# Patient Record
(demographics unavailable — no encounter records)

---

## 2025-03-27 NOTE — HISTORY OF PRESENT ILLNESS
[FreeTextEntry1] : Patient presents for follow up for SNHL, decreased hearing. Hearing different sounds lately. He hears noises in R ear especially when quiet. Symptoms worsened last Monday. Had episodes of B/L decreased hearing that resolved on its own. Last audiogram 5/8/24. He was to have hearing aids but did not.  C/o of dizziness, room spinning for seconds, lightheadedness, imbalance when laying down to getting up.

## 2025-05-15 NOTE — REASON FOR VISIT
[Subsequent Evaluation] : a subsequent evaluation for [FreeTextEntry2] : decreased hearing in right ear, dizziness, SNHL, tinnitus of right ear

## 2025-05-15 NOTE — HISTORY OF PRESENT ILLNESS
[FreeTextEntry1] : Pt returns today c/o decreased hearing in right ear, dizziness, SNHL, tinnitus of right ear.  States that he is doing well. Had dizziness before for few seconds when getting up and laying down. VNG performed today. Pt is using hearing aids and is satisfied.

## 2025-05-15 NOTE — DATA REVIEWED
[de-identified] : VNG 5/15/25 VNG test results were abnormal. Mixed spontaneous and positional nystagmus is consistent with non- localizing vestibular dysfunction. Responses to caloric irrigations reveal right unilateral weakness, consistent with right- sided vestibular dysfunction.

## 2025-07-28 NOTE — HISTORY OF PRESENT ILLNESS
[FreeTextEntry1] : Patient returns today c/o  tinnitus . Has  ringing and whooshing in right ear ,  pitch increase and decrease through out the day. Feels like it has gotten worse. Has been wearing hearing aid left ear, microphone  right ear . Recently had hearing aids checked by audio. Ringing from noon to evening. Continues to have dizziness when lying down as well.